# Patient Record
Sex: FEMALE
[De-identification: names, ages, dates, MRNs, and addresses within clinical notes are randomized per-mention and may not be internally consistent; named-entity substitution may affect disease eponyms.]

---

## 2021-11-04 ENCOUNTER — TRANSCRIPTION ENCOUNTER (OUTPATIENT)
Age: 81
End: 2021-11-04

## 2022-12-05 ENCOUNTER — APPOINTMENT (OUTPATIENT)
Dept: ORTHOPEDIC SURGERY | Facility: CLINIC | Age: 82
End: 2022-12-05

## 2022-12-05 VITALS — BODY MASS INDEX: 26.06 KG/M2 | HEIGHT: 61 IN | WEIGHT: 138 LBS

## 2022-12-05 DIAGNOSIS — I10 ESSENTIAL (PRIMARY) HYPERTENSION: ICD-10-CM

## 2022-12-05 DIAGNOSIS — M17.11 UNILATERAL PRIMARY OSTEOARTHRITIS, RIGHT KNEE: ICD-10-CM

## 2022-12-05 PROBLEM — Z00.00 ENCOUNTER FOR PREVENTIVE HEALTH EXAMINATION: Status: ACTIVE | Noted: 2022-12-05

## 2022-12-05 PROCEDURE — 99203 OFFICE O/P NEW LOW 30 MIN: CPT | Mod: 25

## 2022-12-05 PROCEDURE — 20610 DRAIN/INJ JOINT/BURSA W/O US: CPT | Mod: RT

## 2022-12-05 PROCEDURE — 73560 X-RAY EXAM OF KNEE 1 OR 2: CPT | Mod: RT

## 2022-12-05 NOTE — HISTORY OF PRESENT ILLNESS
[8] : 8 [5] : 5 [Dull/Aching] : dull/aching [Sharp] : sharp [Frequent] : frequent [Household chores] : household chores [Leisure] : leisure [Sleep] : sleep [Meds] : meds [Ice] : ice [Standing] : standing [Walking] : walking [Stairs] : stairs [Lying in bed] : lying in bed [Retired] : Work status: retired [de-identified] : 12-5-22- 2+ week h/o lateral and posterior right knee pain. denies injury. Pain worse with ambulation and start up. she has been using a cane and has tried asa without help  [] : no [FreeTextEntry1] : right knee [FreeTextEntry5] : pt states she has been having right knee pain for about two weeks. pt states a lot of the pain is behind the knee. pt states she was turning in her bed and believes she turned over wrong. pt using a cane due to knee pain. pt states she went to the chiropractor with good relief. was told her knee may have to be drained. [FreeTextEntry9] : asprin [de-identified] : 12/2/2022 [de-identified] : chiropractor

## 2022-12-05 NOTE — PHYSICAL EXAM
[NL (0)] : extension 0 degrees [5___] : hamstring 5[unfilled]/5 [Equivocal] : equivocal Lizeth [] : negative Lachmann [Right] : right knee [AP] : anteroposterior [Lateral] : lateral [advanced tricompartmental OA with medial compartment narrowing and varus alignment] : advanced tricompartmental OA with medial compartment narrowing and varus alignment [TWNoteComboBox7] : flexion 110 degrees

## 2024-03-01 ENCOUNTER — APPOINTMENT (OUTPATIENT)
Dept: ORTHOPEDIC SURGERY | Facility: CLINIC | Age: 84
End: 2024-03-01
Payer: MEDICARE

## 2024-03-01 VITALS — HEIGHT: 61 IN | WEIGHT: 138 LBS | BODY MASS INDEX: 26.06 KG/M2

## 2024-03-01 DIAGNOSIS — M17.0 BILATERAL PRIMARY OSTEOARTHRITIS OF KNEE: ICD-10-CM

## 2024-03-01 PROCEDURE — 73560 X-RAY EXAM OF KNEE 1 OR 2: CPT | Mod: 50

## 2024-03-01 PROCEDURE — J3490M: CUSTOM

## 2024-03-01 PROCEDURE — 99213 OFFICE O/P EST LOW 20 MIN: CPT | Mod: 25

## 2024-03-01 PROCEDURE — 20610 DRAIN/INJ JOINT/BURSA W/O US: CPT | Mod: 50

## 2024-03-01 NOTE — HISTORY OF PRESENT ILLNESS
[de-identified] :  03/01/2024: known right knee oa in years past did well with csi last one was 2 years ago. Over the last several months b/l knee pain and ambulating with a limp  [] : no [FreeTextEntry1] : b/l knees  [FreeTextEntry5] : no injury,

## 2024-03-01 NOTE — PHYSICAL EXAM
[Bilateral] : knee bilaterally [NL (0)] : extension 0 degrees [5___] : hamstring 5[unfilled]/5 [Equivocal] : equivocal Lizeth [] : negative Lachmann [TWNoteComboBox7] : flexion 125 degrees

## 2024-03-01 NOTE — IMAGING
[Bilateral] : knee bilaterally [AP] : anteroposterior [Lateral] : lateral [advanced tricompartmental OA with medial compartment narrowing and varus alignment] : advanced tricompartmental OA with medial compartment narrowing and varus alignment

## 2024-03-01 NOTE — PROCEDURE
[Large Joint Injection] : Large joint injection [Bilateral] : bilaterally of the [Knee] : knee [Inflammation] : inflammation [Pain] : pain [Alcohol] : alcohol [Betadine] : betadine [Ethyl Chloride sprayed topically] : ethyl chloride sprayed topically [Sterile technique used] : sterile technique used [___ cc    6mg] :  Betamethasone (Celestone) ~Vcc of 6mg [___ cc    0.5%] : Bupivacaine (Marcaine) ~Vcc of 0.5%  [] : Patient tolerated procedure well [Call if redness, pain or fever occur] : call if redness, pain or fever occur [Apply ice for 15min out of every hour for the next 12-24 hours as tolerated] : apply ice for 15 minutes out of every hour for the next 12-24 hours as tolerated [Patient was advised to rest the joint(s) for ____ days] : patient was advised to rest the joint(s) for [unfilled] days [Previous OTC use and PT nontherapeutic] : patient has tried OTC's including aspirin, Ibuprofen, Aleve, etc or prescription NSAIDS, and/or exercises at home and/or physical therapy without satisfactory response [Risks, benefits, alternatives discussed / Verbal consent obtained] : the risks benefits, and alternatives have been discussed, and verbal consent was obtained [Patient had decreased mobility in the joint] : patient had decreased mobility in the joint

## 2024-09-05 ENCOUNTER — APPOINTMENT (OUTPATIENT)
Dept: ORTHOPEDIC SURGERY | Facility: CLINIC | Age: 84
End: 2024-09-05

## 2024-09-05 PROCEDURE — 99213 OFFICE O/P EST LOW 20 MIN: CPT | Mod: 25

## 2024-09-05 PROCEDURE — 20610 DRAIN/INJ JOINT/BURSA W/O US: CPT | Mod: 50

## 2024-09-05 PROCEDURE — J3490M: CUSTOM

## 2024-09-05 NOTE — ASSESSMENT
[FreeTextEntry1] : - Today for acute pain administered cortisone injections to both knees -Requesting approval for bilateral knee Euflexxa injection series of 3 -Follow-up once Euflexxa series received

## 2024-09-05 NOTE — HISTORY OF PRESENT ILLNESS
[de-identified] :   09/05/2024: Known bilateral knee osteoarthritis has done well in the past with cortisone injection recently pain has returned and is requesting repeat injections  03/01/2024: known right knee oa in years past did well with csi last one was 2 years ago. Over the last several months b/l knee pain and ambulating with a limp  [] : no [FreeTextEntry1] : b/l knees  [FreeTextEntry5] : no injury,

## 2024-09-05 NOTE — PHYSICAL EXAM
[Bilateral] : knee bilaterally [NL (0)] : extension 0 degrees [5___] : hamstring 5[unfilled]/5 [Equivocal] : equivocal Lizeth [] : negative Lachmann [TWNoteComboBox7] : flexion 120 degrees

## 2024-09-05 NOTE — REASON FOR VISIT
[FreeTextEntry2] : b/l knees 
I have personally performed a face to face diagnostic evaluation on this patient. I have reviewed the ACP note and agree with the history, exam and plan of care, except as noted.

## 2024-09-12 ENCOUNTER — APPOINTMENT (OUTPATIENT)
Dept: ORTHOPEDIC SURGERY | Facility: CLINIC | Age: 84
End: 2024-09-12
Payer: MEDICARE

## 2024-09-12 DIAGNOSIS — M17.0 BILATERAL PRIMARY OSTEOARTHRITIS OF KNEE: ICD-10-CM

## 2024-09-12 PROCEDURE — 99213 OFFICE O/P EST LOW 20 MIN: CPT

## 2024-09-12 NOTE — ASSESSMENT
[FreeTextEntry1] : She had decent response to CSI- this can be repeated every 3-4 months as needed discussed [facility] program, but no guarantee this would be of help as she does have severe OA

## 2024-09-12 NOTE — HISTORY OF PRESENT ILLNESS
[de-identified] :   09/05/2024: Known bilateral knee osteoarthritis has done well in the past with cortisone injection recently pain has returned and is requesting repeat injections  03/01/2024: known right knee oa in years past did well with csi last one was 2 years ago. Over the last several months b/l knee pain and ambulating with a limp  [] : no [FreeTextEntry1] : b/l knees  [FreeTextEntry5] : no injury,

## 2025-01-09 ENCOUNTER — APPOINTMENT (OUTPATIENT)
Dept: ORTHOPEDIC SURGERY | Facility: CLINIC | Age: 85
End: 2025-01-09
Payer: MEDICARE

## 2025-01-09 DIAGNOSIS — M17.0 BILATERAL PRIMARY OSTEOARTHRITIS OF KNEE: ICD-10-CM

## 2025-01-09 PROCEDURE — 20610 DRAIN/INJ JOINT/BURSA W/O US: CPT | Mod: 50

## 2025-01-09 PROCEDURE — 99213 OFFICE O/P EST LOW 20 MIN: CPT | Mod: 25
